# Patient Record
Sex: FEMALE | Race: AMERICAN INDIAN OR ALASKA NATIVE
[De-identification: names, ages, dates, MRNs, and addresses within clinical notes are randomized per-mention and may not be internally consistent; named-entity substitution may affect disease eponyms.]

---

## 2019-12-15 ENCOUNTER — HOSPITAL ENCOUNTER (INPATIENT)
Dept: HOSPITAL 5 - TRG | Age: 24
LOS: 3 days | Discharge: HOME | End: 2019-12-18
Attending: OBSTETRICS & GYNECOLOGY | Admitting: OBSTETRICS & GYNECOLOGY
Payer: MEDICAID

## 2019-12-15 DIAGNOSIS — Z3A.40: ICD-10-CM

## 2019-12-15 PROCEDURE — 86901 BLOOD TYPING SEROLOGIC RH(D): CPT

## 2019-12-15 PROCEDURE — 85014 HEMATOCRIT: CPT

## 2019-12-15 PROCEDURE — 36415 COLL VENOUS BLD VENIPUNCTURE: CPT

## 2019-12-15 PROCEDURE — 86850 RBC ANTIBODY SCREEN: CPT

## 2019-12-15 PROCEDURE — 85018 HEMOGLOBIN: CPT

## 2019-12-15 PROCEDURE — 86900 BLOOD TYPING SEROLOGIC ABO: CPT

## 2019-12-15 PROCEDURE — 85027 COMPLETE CBC AUTOMATED: CPT

## 2019-12-15 RX ADMIN — SODIUM CHLORIDE, SODIUM LACTATE, POTASSIUM CHLORIDE, AND CALCIUM CHLORIDE SCH MLS/HR: .6; .31; .03; .02 INJECTION, SOLUTION INTRAVENOUS at 23:30

## 2019-12-16 LAB
HCT VFR BLD CALC: 28.2 % (ref 30.3–42.9)
HCT VFR BLD CALC: 33.5 % (ref 30.3–42.9)
HGB BLD-MCNC: 10 GM/DL (ref 10.1–14.3)
HGB BLD-MCNC: 11.9 GM/DL (ref 10.1–14.3)
MCHC RBC AUTO-ENTMCNC: 36 % (ref 30–34)
MCV RBC AUTO: 85 FL (ref 79–97)
PLATELET # BLD: 201 K/MM3 (ref 140–440)
RBC # BLD AUTO: 3.94 M/MM3 (ref 3.65–5.03)

## 2019-12-16 PROCEDURE — 00HU33Z INSERTION OF INFUSION DEVICE INTO SPINAL CANAL, PERCUTANEOUS APPROACH: ICD-10-PCS | Performed by: OBSTETRICS & GYNECOLOGY

## 2019-12-16 PROCEDURE — 3E0R3BZ INTRODUCTION OF ANESTHETIC AGENT INTO SPINAL CANAL, PERCUTANEOUS APPROACH: ICD-10-PCS | Performed by: OBSTETRICS & GYNECOLOGY

## 2019-12-16 RX ADMIN — IBUPROFEN SCH MG: 600 TABLET, FILM COATED ORAL at 10:17

## 2019-12-16 RX ADMIN — IBUPROFEN SCH MG: 600 TABLET, FILM COATED ORAL at 18:36

## 2019-12-16 RX ADMIN — EPHEDRINE SULFATE PRN MG: 50 INJECTION INTRAVENOUS at 01:45

## 2019-12-16 RX ADMIN — SODIUM CHLORIDE, SODIUM LACTATE, POTASSIUM CHLORIDE, AND CALCIUM CHLORIDE SCH MLS/HR: .6; .31; .03; .02 INJECTION, SOLUTION INTRAVENOUS at 03:37

## 2019-12-16 RX ADMIN — EPHEDRINE SULFATE PRN MG: 50 INJECTION INTRAVENOUS at 01:30

## 2019-12-16 RX ADMIN — DOCUSATE SODIUM SCH MG: 100 CAPSULE, LIQUID FILLED ORAL at 10:17

## 2019-12-16 RX ADMIN — SODIUM CHLORIDE, SODIUM LACTATE, POTASSIUM CHLORIDE, AND CALCIUM CHLORIDE SCH MLS/HR: .6; .31; .03; .02 INJECTION, SOLUTION INTRAVENOUS at 01:47

## 2019-12-16 RX ADMIN — IBUPROFEN SCH: 600 TABLET, FILM COATED ORAL at 18:10

## 2019-12-16 RX ADMIN — SODIUM CHLORIDE, SODIUM LACTATE, POTASSIUM CHLORIDE, AND CALCIUM CHLORIDE SCH MLS/HR: .6; .31; .03; .02 INJECTION, SOLUTION INTRAVENOUS at 00:30

## 2019-12-16 RX ADMIN — Medication SCH EACH: at 10:17

## 2019-12-16 NOTE — HISTORY AND PHYSICAL REPORT
History of Present Illness


Date of examination: 19


Date of admission: 


12/15/19 23:56





Chief complaint: 





My water broke


History of present illness: 





Pt is a 24 year old  who presents at 40 weeks with SROM. Her prenatal course

has been uncomplicated. She transferred into [facility] at 23 weeks gestation 

after receiving care in [place]. She is GBS negative. 





Past History


Past Medical History: no pertinent history


Past Surgical History: no surgical history


Family/Genetic History: none


Social history: single





- Obstetrical History


Expected Date of Delivery: 19


Actual Gestation: 40 Week(s) 0 Day(s) 


: 2


Para: 0


Number of Living Children: 0





Medications and Allergies


                                    Allergies











Allergy/AdvReac Type Severity Reaction Status Date / Time


 


No Known Allergies Allergy   Unverified 12/15/19 23:55











Active Meds: 


Active Medications





Ephedrine Sulfate (Ephedrine Sulfate)  10 mg IV Q2M PRN


   PRN Reason: Hypotension


Ephedrine Sulfate (Ephedrine Sulfate)  10 mg IV Q2M PRN


   PRN Reason: Hypotension


Fentanyl (Sublimaze)  100 mcg IV Q2H PRN


   PRN Reason: Labor Pain


Lactated Ringer's (Lactated Ringers)  1,000 mls @ 125 mls/hr IV AS DIRECT CYNDI


Oxytocin/Sodium Chloride (Pitocin/Ns 20 Unit/1000ml Drip)  20 units in 1,000 mls

@ 125 mls/hr IV AS DIRECT CYNDI


Fentanyl/Bupivacaine/Sodium Chlor (Fentanyl-Bupiv 2 Mcg/Ml-0.125%)  200 mcg in 

100 mls @ 12 mls/hr EPIDURAL TITR CYNDI; Protocol


Mineral Oil (Mineral Oil)  30 ml PO QHS PRN


   PRN Reason: Constipation


Naloxone HCl (Naloxone)  0.2 mg IV Q5M PRN


   PRN Reason: Respiratory sedation


Terbutaline Sulfate (Brethine)  0.25 mg SUB-Q ONCE PRN


   PRN Reason: Hyperstimulation/Hypertonicity


Terbutaline Sulfate (Brethine)  0.25 mg IVP ONCE PRN


   PRN Reason: Hyperstimulation/Hypertonicity











Review of Systems


All systems: negative


Eyes: deferred


Genitourinary: leakage of fluid, contractions


Rectal Exam: deferred





- Vital Signs


Vital signs: 


                                   Vital Signs











Pulse BP


 


 76   117/68 


 


 19 00:15  19 00:15








                                        











Temp Pulse Resp BP Pulse Ox


 


    81      117/68   100 


 


    19 00:48     19 00:15  19 00:48














- Physical Exam


Breasts: Positive: deferred


Cardiovascular: Regular rate, Normal S1, Normal S2


Lungs: Positive: Clear to auscultation, Normal air movement


Abdomen: Positive: normal appearance, soft, normal bowel sounds.  Negative: 

distention, tenderness


Genitourinary (Female): Positive: normal external genitalia, normal perenium


Vulva: both: normal


Vagina: Positive: normal moisture.  Negative: discharge


Cervix: Negative: lesion, discharge


Uterus: Positive: normal size, normal contour


Adnexa: both: normal


Anus/Rectum: Positive: normal perianal skin, heme negative.  Negative: rectal 

mass, hemorrhoids


Extremities: 


Deep Tendon Reflex Grade: Normal +2





- Obstetrical


FHR: auscultation normal


Cervical Dilatation: 2


Cervical Effacement Percentage: 80


Fetal station: -2


Uterine Contraction Pattern: Regular


Uterine Tone Measurement Phase: Contraction


Uterine Contraction Intensity: Moderate





Results


Result Diagrams: 


                                 12/15/19 23:40





                              Abnormal lab results











  12/15/19 Range/Units





  23:40 


 


MCHC  36 H  (30-34)  %








All other labs normal.








Assessment and Plan





IUP at 40 weeks in active labor with srom. Admit to L&D. GBS negative, so no 

need for antibiotics. Anticipate .

## 2019-12-16 NOTE — PROCEDURE NOTE
OB Delivery Note





- Delivery


Date of Delivery: 19


Surgeon: FERNANDA ANTONIO


Estimated blood loss: 200cc





- Vaginal


Delivery presentation: vertex


Delivery position: OP


Intrapartum events: PROM->1hr before delivery


Delivery induction: none


Delivery monitor: external FHT


Route of delivery: 


Delivery placenta: spontaneous


Delivery cord: nuchal cord (x3), true knot


Episiotomy: none


Delivery laceration: none


Anesthesia: epidural


Delivery comments: 





Viable female  delivered over intact perineum at 0459 with triple nuchal 

cord reduced on the perineum, as well as a true knot. Weight 2643 grams/5 pounds

13 ounces. apgars 8/9. Placenta delivered spontaneously and intact with 3vc. No 

lacerations. Patient tolerated procedure well.





- Infant


  ** A


Apgar at 1 minute: 8


Apgar at 5 minutes: 9


Infant Gender: Female (2643 grams)

## 2019-12-16 NOTE — ANESTHESIA CONSULTATION
Anesthesia Consult and Med Hx


Date of service: 12/16/19





- Airway


Anesthetic Teeth Evaluation: Good


ROM Head & Neck: Adequate


Mental/Hyoid Distance: Adequate


Mallampati Class: Class II


Intubation Access Assessment: Probably Good





- Pulmonary Exam


CTA: Yes





- Cardiac Exam


Cardiac Exam: RRR





- Pre-Operative Health Status


ASA Pre-Surgery Classification: ASA3


Proposed Anesthetic Plan: Epidural





- Pulmonary


Hx Smoking: Yes


Hx Asthma: No





- Cardiovascular System


Hx Hypertension: No





- Hematic


Hx Anemia: No





- Other Systems


Hx Alcohol Use: Yes


Hx Substance Use: Yes (Marijuana)

## 2019-12-17 RX ADMIN — DOCUSATE SODIUM SCH MG: 100 CAPSULE, LIQUID FILLED ORAL at 21:39

## 2019-12-17 RX ADMIN — IBUPROFEN SCH MG: 600 TABLET, FILM COATED ORAL at 21:39

## 2019-12-17 RX ADMIN — Medication SCH EACH: at 09:48

## 2019-12-17 RX ADMIN — IBUPROFEN SCH MG: 600 TABLET, FILM COATED ORAL at 06:25

## 2019-12-17 RX ADMIN — DOCUSATE SODIUM SCH MG: 100 CAPSULE, LIQUID FILLED ORAL at 09:48

## 2019-12-17 NOTE — PROGRESS NOTE
Assessment and Plan





PPD 1 s/p . Doing well. Plan for discharge on today





Subjective





- Subjective


Date of service: 19


Interval history: 





Pt is a 24 year old  who presents at 40 weeks with SROM. Her prenatal course

has been uncomplicated. She transferred into [facility] at 23 weeks gestation 

after receiving care in [place]. She is GBS negative. 


Patient reports: appetite normal, voiding normally, pain well controlled, ambula

ting normally


[place]: doing well





Objective





- Vital Signs


Latest vital signs: 


                                   Vital Signs











  Temp Pulse Resp BP BP Pulse Ox


 


 19 08:09  98.6 F  96 H  20  120/79   100


 


 19 03:39  98.8 F  77  16   119/67 


 


 19 22:01  98.6 F  79  16   111/60 


 


 19 12:09  98.6 F  89  18  102/56   98


 


 19 09:15  97.8 F  89  18   111/71  99








                                Intake and Output











 19





 22:59 06:59 14:59


 


Intake Total  420 


 


Balance  420 


 


Intake:   


 


  Oral  120 


 


  Intake, Free Water  300 


 


Other:   


 


  Total, Intake Amount  120 


 


  # Voids   


 


    Void  1 














- Exam


Breasts: Present: deferred


Lungs: Present: Clear to auscultation, Normal air movement


Abdomen: Present: normal appearance, soft, normal bowel sounds


Uterus: Present: normal, firm


Extremities: Present: normal





- Labs


Labs: 


                              Abnormal lab results











  19 Range/Units





  23:07 


 


Hgb  10.0 L  (10.1-14.3)  gm/dl


 


Hct  28.2 L  (30.3-42.9)  %

## 2019-12-17 NOTE — DISCHARGE SUMMARY
Providers





- Providers


Date of Admission: 


12/15/19 23:56





Date of discharge: 19


Attending physician: 


FERNANDA ANTONIO





Primary care physician: 


FERNANDA ANTONIO








Hospitalization


Reason for admission: active labor, rupture of membranes


Delivery: 


Other postpartum procedures: none


Postpartum complications: none


Discharge diagnosis: IUP at term delivered


 baby: female


Hospital course: 





unremarkable


Condition at discharge: Good


Disposition: DC-01 TO HOME OR SELFCARE





Plan





- Discharge Medications


Prescriptions: 


Ibuprofen [Motrin] 800 mg PO Q8HR PRN #40 tablet


 PRN Reason: Pain, Mild (1-3)


Pnv No.121/Iron/Folic Acid [Prenatal Multivitamin Tablet] 1 each PO DAILY #30 

tablet





- Provider Discharge Summary


Activity: routine, no sex for 6 weeks, no heavy lifting 4 weeks, no strenuous 

exercise


Diet: routine


Instructions: routine


Additional instructions: 


[]  Smoking cessation referral if applicable(refer to patient education folder 

for contact #)


[]  Refer to [facility] Booklet








Call your doctor immediately for:


* Fever > 100.5


* Heavy vaginal bleeding ( >1 pad per hour)


* Severe persistent headache


* Shortness of breath


* Reddened, hot, painful area to leg or breast


* Drainage or odor from incision.





* Keep incision clean and dry at all times and follow doctor's instructions 

regarding bathing/showering











- Follow up plan


Follow up: 


FERNANDA ANTONIO MD [Primary Care Provider] - 6 Weeks

## 2019-12-18 VITALS — DIASTOLIC BLOOD PRESSURE: 59 MMHG | SYSTOLIC BLOOD PRESSURE: 134 MMHG

## 2019-12-18 RX ADMIN — DOCUSATE SODIUM SCH MG: 100 CAPSULE, LIQUID FILLED ORAL at 10:55

## 2019-12-18 RX ADMIN — Medication SCH EACH: at 10:54

## 2019-12-18 RX ADMIN — IBUPROFEN SCH MG: 600 TABLET, FILM COATED ORAL at 10:54

## 2019-12-18 NOTE — PROGRESS NOTE
Assessment and Plan





PPD 2 doing well. Discharge was cancelled on yesterday due to infant needing 

phototherapy. Will plan for discharge on today.





Subjective





- Subjective


Date of service: 19


Interval history: 





Pt is a 24 year old  who presents at 40 weeks with SROM. Her prenatal course

has been uncomplicated. She transferred into [facility] at 23 weeks gestation 

after receiving care in [place]. She is GBS negative. 


Patient reports: appetite normal, voiding normally


[place]: doing well





Objective





- Vital Signs


Latest vital signs: 


                                   Vital Signs











  Temp Pulse Resp BP BP Pulse Ox


 


 19 08:45  98.6 F  91 H  20   109/76 


 


 19 00:44  97.6 F  76  20  111/70   99


 


 19 16:37  98.5 F  96 H  20  113/74   98








                                Intake and Output











 19





 22:59 06:59 14:59


 


Intake Total 480 480 120


 


Balance 480 480 120


 


Intake:   


 


  Oral 480 480 120


 


Other:   


 


  Total, Intake Amount 240 240 120


 


  # Voids   


 


    Void 1 1 1